# Patient Record
Sex: FEMALE | Race: WHITE | NOT HISPANIC OR LATINO | URBAN - METROPOLITAN AREA
[De-identification: names, ages, dates, MRNs, and addresses within clinical notes are randomized per-mention and may not be internally consistent; named-entity substitution may affect disease eponyms.]

---

## 2022-12-23 ENCOUNTER — TELEPHONE (OUTPATIENT)
Dept: PEDIATRIC CARDIOLOGY | Facility: CLINIC | Age: 27
End: 2022-12-23

## 2022-12-23 NOTE — TELEPHONE ENCOUNTER
Called and spoke with patient in response to the below message. Patient explained that her nephew is a patient of Dr. Back and they have a family h/o CHD so she is needing to get scheduled for fetal echo with Dr. Back and she is aware. Advised patient that she will need to have her OB/GYN place an order and send a referral and then once received we will reach out to schedule. Patient verbalized understanding.     ----- Message from Saranya Ruiz sent at 12/23/2022 11:13 AM CST -----  Contact: Mom [phone]  Caller is requesting an earlier appointment than what we can offer.  Caller declined first available appointment listed below.  Caller will not accept being placed on the waitlist and is requesting a message be sent to doctor.    Did you offer to schedule the next available appt and put the patient on the wait list:  N/A    When is the first available appointment: No    Preference of timeframe to be scheduled:  asap    Would the patient prefer a call back or a response via [facility]:  Portal    Additional Information:  Pt is calling to schedule an Echo appt with Dr. Back.